# Patient Record
Sex: FEMALE | Race: ASIAN | NOT HISPANIC OR LATINO
[De-identification: names, ages, dates, MRNs, and addresses within clinical notes are randomized per-mention and may not be internally consistent; named-entity substitution may affect disease eponyms.]

---

## 2017-02-27 ENCOUNTER — TRANSCRIPTION ENCOUNTER (OUTPATIENT)
Age: 72
End: 2017-02-27

## 2017-03-01 PROBLEM — Z00.00 ENCOUNTER FOR PREVENTIVE HEALTH EXAMINATION: Status: ACTIVE | Noted: 2017-03-01

## 2017-03-02 ENCOUNTER — APPOINTMENT (OUTPATIENT)
Dept: COLORECTAL SURGERY | Facility: CLINIC | Age: 72
End: 2017-03-02

## 2017-03-02 DIAGNOSIS — Z80.0 FAMILY HISTORY OF MALIGNANT NEOPLASM OF DIGESTIVE ORGANS: ICD-10-CM

## 2017-03-02 DIAGNOSIS — Z87.19 PERSONAL HISTORY OF OTHER DISEASES OF THE DIGESTIVE SYSTEM: ICD-10-CM

## 2017-03-02 DIAGNOSIS — Z86.79 PERSONAL HISTORY OF OTHER DISEASES OF THE CIRCULATORY SYSTEM: ICD-10-CM

## 2017-03-02 DIAGNOSIS — Z86.39 PERSONAL HISTORY OF OTHER ENDOCRINE, NUTRITIONAL AND METABOLIC DISEASE: ICD-10-CM

## 2017-03-02 DIAGNOSIS — Z87.2 PERSONAL HISTORY OF DISEASES OF THE SKIN AND SUBCUTANEOUS TISSUE: ICD-10-CM

## 2017-03-02 DIAGNOSIS — Z78.9 OTHER SPECIFIED HEALTH STATUS: ICD-10-CM

## 2017-03-02 DIAGNOSIS — Z87.39 PERSONAL HISTORY OF OTHER DISEASES OF THE MUSCULOSKELETAL SYSTEM AND CONNECTIVE TISSUE: ICD-10-CM

## 2017-03-08 ENCOUNTER — APPOINTMENT (OUTPATIENT)
Dept: COLORECTAL SURGERY | Facility: CLINIC | Age: 72
End: 2017-03-08

## 2017-03-08 VITALS
BODY MASS INDEX: 24.16 KG/M2 | WEIGHT: 145 LBS | RESPIRATION RATE: 14 BRPM | HEART RATE: 76 BPM | TEMPERATURE: 98 F | DIASTOLIC BLOOD PRESSURE: 79 MMHG | HEIGHT: 65 IN | SYSTOLIC BLOOD PRESSURE: 121 MMHG

## 2017-03-08 DIAGNOSIS — K64.8 OTHER HEMORRHOIDS: ICD-10-CM

## 2017-03-08 PROBLEM — Z80.0 FAMILY HISTORY OF PANCREATIC CANCER: Status: ACTIVE | Noted: 2017-03-08

## 2017-03-08 PROBLEM — Z87.2 HISTORY OF LICHEN PLANUS: Status: RESOLVED | Noted: 2017-03-08 | Resolved: 2017-03-08

## 2017-03-08 PROBLEM — Z86.79 HISTORY OF HYPERTENSION: Status: RESOLVED | Noted: 2017-03-08 | Resolved: 2017-03-08

## 2017-03-08 RX ORDER — LIDOCAINE 5 G/100G
5 OINTMENT TOPICAL
Qty: 30 | Refills: 0 | Status: ACTIVE | COMMUNITY
Start: 2017-03-08 | End: 1900-01-01

## 2017-03-09 PROBLEM — Z87.19 HISTORY OF HEMORRHOIDS: Status: RESOLVED | Noted: 2017-03-08 | Resolved: 2017-03-09

## 2017-03-09 PROBLEM — Z87.39 HISTORY OF ARTHRITIS: Status: RESOLVED | Noted: 2017-03-08 | Resolved: 2017-03-09

## 2017-03-09 PROBLEM — Z86.79 HISTORY OF HYPERTENSION: Status: RESOLVED | Noted: 2017-03-08 | Resolved: 2017-03-09

## 2017-03-09 PROBLEM — Z86.39 HISTORY OF THYROID DISEASE: Status: RESOLVED | Noted: 2017-03-08 | Resolved: 2017-03-09

## 2017-03-09 PROBLEM — Z78.9 SOCIAL ALCOHOL USE: Status: ACTIVE | Noted: 2017-03-08

## 2017-03-09 RX ORDER — METRONIDAZOLE 500 MG/1
500 TABLET ORAL 3 TIMES DAILY
Qty: 15 | Refills: 0 | Status: ACTIVE | COMMUNITY
Start: 2017-03-09 | End: 1900-01-01

## 2017-03-09 RX ORDER — LEVOTHYROXINE SODIUM 88 UG/1
88 TABLET ORAL
Refills: 0 | Status: ACTIVE | COMMUNITY

## 2017-03-09 RX ORDER — DIETHYLTOLUAMIDE 7 %
600-40-500 SPRAY, NON-AEROSOL (ML) TOPICAL
Refills: 0 | Status: ACTIVE | COMMUNITY

## 2017-03-09 RX ORDER — NAPROXEN 500 MG/1
500 TABLET ORAL
Refills: 0 | Status: ACTIVE | COMMUNITY

## 2017-03-09 RX ORDER — VALSARTAN AND HYDROCHLOROTHIAZIDE 80; 12.5 MG/1; MG/1
80-12.5 TABLET, FILM COATED ORAL
Refills: 0 | Status: ACTIVE | COMMUNITY

## 2017-03-09 RX ORDER — CIPROFLOXACIN HYDROCHLORIDE 500 MG/1
500 TABLET, FILM COATED ORAL
Qty: 14 | Refills: 0 | Status: DISCONTINUED | COMMUNITY
Start: 2017-03-08 | End: 2017-03-09

## 2017-03-17 ENCOUNTER — APPOINTMENT (OUTPATIENT)
Dept: COLORECTAL SURGERY | Facility: CLINIC | Age: 72
End: 2017-03-17

## 2017-03-17 VITALS
SYSTOLIC BLOOD PRESSURE: 137 MMHG | WEIGHT: 145 LBS | HEIGHT: 65 IN | RESPIRATION RATE: 14 BRPM | DIASTOLIC BLOOD PRESSURE: 80 MMHG | OXYGEN SATURATION: 97 % | BODY MASS INDEX: 24.16 KG/M2 | TEMPERATURE: 99.2 F | HEART RATE: 66 BPM

## 2017-03-17 DIAGNOSIS — K62.89 OTHER SPECIFIED DISEASES OF ANUS AND RECTUM: ICD-10-CM

## 2017-03-17 DIAGNOSIS — K62.5 HEMORRHAGE OF ANUS AND RECTUM: ICD-10-CM

## 2017-03-29 PROBLEM — K62.89 RECTAL PAIN: Status: ACTIVE | Noted: 2017-03-16

## 2017-03-29 PROBLEM — K62.5 RECTAL BLEEDING: Status: ACTIVE | Noted: 2017-03-09

## 2017-04-05 ENCOUNTER — APPOINTMENT (OUTPATIENT)
Dept: COLORECTAL SURGERY | Facility: CLINIC | Age: 72
End: 2017-04-05

## 2017-04-05 VITALS
SYSTOLIC BLOOD PRESSURE: 158 MMHG | BODY MASS INDEX: 24.16 KG/M2 | HEIGHT: 65 IN | WEIGHT: 145 LBS | TEMPERATURE: 98 F | RESPIRATION RATE: 14 BRPM | DIASTOLIC BLOOD PRESSURE: 77 MMHG | HEART RATE: 65 BPM

## 2017-04-05 DIAGNOSIS — K64.8 OTHER HEMORRHOIDS: ICD-10-CM

## 2017-04-05 DIAGNOSIS — L29.0 PRURITUS ANI: ICD-10-CM

## 2017-04-05 DIAGNOSIS — R15.9 FULL INCONTINENCE OF FECES: ICD-10-CM

## 2017-04-06 ENCOUNTER — APPOINTMENT (OUTPATIENT)
Dept: COLORECTAL SURGERY | Facility: CLINIC | Age: 72
End: 2017-04-06

## 2017-04-26 ENCOUNTER — APPOINTMENT (OUTPATIENT)
Dept: COLORECTAL SURGERY | Facility: CLINIC | Age: 72
End: 2017-04-26

## 2017-05-10 ENCOUNTER — APPOINTMENT (OUTPATIENT)
Dept: COLORECTAL SURGERY | Facility: CLINIC | Age: 72
End: 2017-05-10

## 2017-05-12 ENCOUNTER — APPOINTMENT (OUTPATIENT)
Dept: COLORECTAL SURGERY | Facility: HOSPITAL | Age: 72
End: 2017-05-12

## 2019-06-05 ENCOUNTER — TRANSCRIPTION ENCOUNTER (OUTPATIENT)
Age: 74
End: 2019-06-05

## 2020-08-13 ENCOUNTER — TRANSCRIPTION ENCOUNTER (OUTPATIENT)
Age: 75
End: 2020-08-13

## 2022-01-28 ENCOUNTER — TRANSCRIPTION ENCOUNTER (OUTPATIENT)
Age: 77
End: 2022-01-28

## 2023-08-23 ENCOUNTER — NON-APPOINTMENT (OUTPATIENT)
Age: 78
End: 2023-08-23

## 2023-09-21 ENCOUNTER — EMERGENCY (EMERGENCY)
Facility: HOSPITAL | Age: 78
LOS: 0 days | Discharge: HOME | End: 2023-09-21
Attending: EMERGENCY MEDICINE
Payer: MEDICARE

## 2023-09-21 VITALS
OXYGEN SATURATION: 100 % | DIASTOLIC BLOOD PRESSURE: 72 MMHG | HEART RATE: 69 BPM | TEMPERATURE: 98 F | SYSTOLIC BLOOD PRESSURE: 172 MMHG | RESPIRATION RATE: 18 BRPM

## 2023-09-21 VITALS — WEIGHT: 141.98 LBS | HEIGHT: 64 IN

## 2023-09-21 DIAGNOSIS — M79.601 PAIN IN RIGHT ARM: ICD-10-CM

## 2023-09-21 DIAGNOSIS — M25.531 PAIN IN RIGHT WRIST: ICD-10-CM
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
## 2023-09-21 DIAGNOSIS — S01.01XA LACERATION WITHOUT FOREIGN BODY OF SCALP, INITIAL ENCOUNTER: ICD-10-CM

## 2023-09-21 DIAGNOSIS — Z88.1 ALLERGY STATUS TO OTHER ANTIBIOTIC AGENTS STATUS: ICD-10-CM
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
## 2023-09-21 PROCEDURE — 99284 EMERGENCY DEPT VISIT MOD MDM: CPT | Mod: FS,25

## 2023-09-21 PROCEDURE — 12001 RPR S/N/AX/GEN/TRNK 2.5CM/<: CPT

## 2023-09-21 PROCEDURE — 93010 ELECTROCARDIOGRAM REPORT: CPT

## 2023-09-21 PROCEDURE — 70450 CT HEAD/BRAIN W/O DYE: CPT | Mod: MA

## 2023-09-21 PROCEDURE — 70450 CT HEAD/BRAIN W/O DYE: CPT | Mod: 26,MA

## 2023-09-21 PROCEDURE — 93005 ELECTROCARDIOGRAM TRACING: CPT | Mod: XU

## 2023-09-21 PROCEDURE — 99284 EMERGENCY DEPT VISIT MOD MDM: CPT | Mod: 25

## 2023-09-21 NOTE — ED PROVIDER NOTE - IV ALTEPLASE EXCL REL HIDDEN
----- Message from Marlen Noriega MA sent at 5/24/2022  8:24 AM EDT -----  Regarding: FW: Anxiety medicine    ----- Message -----  From: Mason Parada  Sent: 5/23/2022   6:58 PM EDT  To: [facility]  Subject: Anxiety medicine                                 No we have not tried either of them.    
show

## 2023-09-21 NOTE — ED STATDOCS - PROGRESS NOTE DETAILS
78 yo female with without any significant PMH presents with R head injury s/p fall. Pt was practicing balancing exercises and fell hitting the R scalp against a chair. Denies LOC, vomiting, AC use.   Will repair lac and obtain CT. Likely d/c home. -Dirk Scanlon PA-C Lac repaired and CT negative for ICH or fracture. WIll d/c home. -Dirk Scanlon PA-C

## 2023-09-21 NOTE — ED ADULT NURSE NOTE - OBJECTIVE STATEMENT
Pt is 77y female, A&OX4, ambulatory s/p fall while exercising this morning. Pt states she was doing a balance exercise when she fell over and hit her head on a chair.  Pt denies any dizziness, lightheadedness, or LOC when she fell. Pt endorses head strike with laceration, with pain presenting as a 3/10.  Patient does not currently endorse any dizziness/lightheadedness, SOB, chest pain, N/V, or urinary symptoms.  Pt is awaiting CT. in NAD.

## 2023-09-21 NOTE — ED STATDOCS - NSFOLLOWUPINSTRUCTIONS_ED_ALL_ED_FT
Keep the wound dry for 24 hours.   Afterwards, keep clean with soap and water.   Apply bacitracin 3 times a day.   Return to the ER in 10 days to have the staples removed.   Return to the Emergency Department for worsening or persistent symptoms, and/or ANY NEW OR CONCERNING SYMPTOMS. If you have issues obtaining follow up, please call: [phone] or [phone]  to obtain a doctor or specialist who takes your insurance in your area.           Head Injury    WHAT YOU NEED TO KNOW:    What do I need to know about a head injury? A head injury can include your scalp, face, skull, or brain and range from mild to severe. Effects can appear immediately after the injury or develop later. The effects may last a short time or be permanent. Healthcare providers may want to check your recovery over time. Treatment may change as you recover or develop new health problems from the head injury.    What are the signs and symptoms of a head injury?    An open scalp or skin wound, swelling, or bruising    Mild to moderate headache    Dizziness or loss of balance    Nausea or vomiting    Ringing in the ears or neck pain    Confusion, especially right after the injury    Change in mood, such as feeling restless or irritable    Trouble thinking, remembering, or concentrating    Drowsiness or decreased amount of energy    Trouble sleeping  How is a head injury diagnosed?    Tell your healthcare provider about your injury and symptoms. The provider will do an exam to check your brain function. He or she will check how your pupils react to light. He or she will check your memory, hand grasp, and balance.    You may need x-rays, a CT scan, or an MRI to check for bleeding or major damage to your skull or brain. You may be given contrast liquid to help the pictures show up better. Tell the healthcare provider if you have ever had an allergic reaction to contrast liquid. Do not enter the MRI room with anything metal. Metal can cause serious injury. Tell the provider if you have any metal in or on your body.  How is a head injury treated? A mild head injury may not need to be treated. You may be given medicine to decrease pain. Other treatments may depend on how severe your head injury is. A concussion, hematoma (collection of blood), or traumatic brain injury may need both immediate and long-term treatment.    How can I manage my symptoms?    Rest or do quiet activities. Limit your time watching TV, using the computer, or doing tasks that require a lot of thinking. Slowly return to your normal activities as directed. Do not play sports or do activities that may cause you to get hit in the head. Ask your healthcare provider when you can return to sports.    Apply ice on your head for 15 to 20 minutes every hour or as directed. Use an ice pack, or put crushed ice in a plastic bag. Cover it with a towel before you apply it to your skin. Ice helps prevent tissue damage and decreases swelling and pain.    Have someone stay with you for 24 hours , or as directed. This person can monitor you for problems and call for help if needed. When you are awake, the person should ask you a few questions every few hours to see if you are thinking clearly. An example is to ask your name or address.  What can I do to prevent another head injury?    Wear a helmet that fits properly. Do this when you play sports, or ride a bike, scooter, or skateboard. Helmets help decrease your risk for a serious head injury. Talk to your healthcare provider about other ways you can protect yourself if you play sports.    Wear your seatbelt every time you are in a car. This helps lower your risk for a head injury if you are in a car accident.  Call your local emergency number (911 in the [place]), or have someone else call if:    You cannot be woken.    You have a seizure.    You stop responding to others or you faint.    You have blurry or double vision.    Your speech becomes slurred or confused.    You have arm or leg weakness, loss of feeling, or new problems with coordination.    Your pupils are larger than usual, or one pupil is a different size than the other.    You have blood or clear fluid coming out of your ears or nose.  When should I seek immediate care?    You have repeated or forceful vomiting.    You feel confused.    Your headache gets worse or becomes severe.    You or someone caring for you notices that you are harder to wake than usual.  When should I call my doctor?    Your symptoms last longer than 6 weeks after the injury.    You have questions or concerns about your condition or care.

## 2023-09-21 NOTE — ED ADULT TRIAGE NOTE - CHIEF COMPLAINT QUOTE
Pt states she was doing a balance exercise this morning and fell over. Endorses laceration to the top of her head and R forearm pain. No LOC or anticoagulant use. Denies other injuries. Ambulatory to triage.

## 2023-09-21 NOTE — ED STATDOCS - CLINICAL SUMMARY MEDICAL DECISION MAKING FREE TEXT BOX
plan pt not on blood thinner however over age 64 will do ct head, lac repair pt ambulatory in ED with no other injuries

## 2023-09-21 NOTE — ED STATDOCS - OBJECTIVE STATEMENT
76 yo female with no pertinent PMHx presents to the ED s/p fall while doing exercise. Pt states she fell onto her right side. Pt not endorsing any hip pain, but endorsing right forearm and wrist pain. Pt is UTD on tetanus vaccination.

## 2023-09-21 NOTE — ED STATDOCS - PATIENT PORTAL LINK FT
You can access the FollowMyHealth Patient Portal offered by [facility] [place] by registering at the following website: [email or web address]. By joining [employer]’s FollowMyHealth portal, you will also be able to view your health information using other applications (apps) compatible with our system.

## 2023-09-21 NOTE — ED ADULT NURSE NOTE - NSFALLUNIVINTERV_ED_ALL_ED
Bed/Stretcher in lowest position, wheels locked, appropriate side rails in place/Call bell, personal items and telephone in reach/Instruct patient to call for assistance before getting out of bed/chair/stretcher/Non-slip footwear applied when patient is off stretcher/[place] to call system/Physically safe environment - no spills, clutter or unnecessary equipment/Purposeful proactive rounding/Room/bathroom lighting operational, light cord in reach

## 2023-09-21 NOTE — ED STATDOCS - PHYSICAL EXAMINATION
Constitutional: NAD AOx3  Eyes: PERRL EOMI  Head: Normocephalic atraumatic, head laceration on superior parietal scalp 3cm long  Mouth: MMM  Cardiac: regular rate and rhythm  Resp: Lungs CTAB  GI: Abd s/nd/nt  Neuro: CN2-12 grossly intact, LEMONS x 4  Skin: No visible rashes     cspine non ttp, right wrist non ttp full ROM no contusion or edema

## 2023-09-27 ENCOUNTER — NON-APPOINTMENT (OUTPATIENT)
Age: 78
End: 2023-09-27

## 2023-09-30 ENCOUNTER — EMERGENCY (EMERGENCY)
Facility: HOSPITAL | Age: 78
LOS: 0 days | Discharge: HOME | End: 2023-09-30
Attending: STUDENT IN AN ORGANIZED HEALTH CARE EDUCATION/TRAINING PROGRAM
Payer: MEDICARE

## 2023-09-30 VITALS
OXYGEN SATURATION: 100 % | RESPIRATION RATE: 18 BRPM | TEMPERATURE: 98 F | DIASTOLIC BLOOD PRESSURE: 63 MMHG | SYSTOLIC BLOOD PRESSURE: 149 MMHG | HEART RATE: 62 BPM

## 2023-09-30 VITALS — WEIGHT: 141.98 LBS | HEIGHT: 64 IN

## 2023-09-30 DIAGNOSIS — S01.01XD LACERATION WITHOUT FOREIGN BODY OF SCALP, SUBSEQUENT ENCOUNTER: ICD-10-CM

## 2023-09-30 PROCEDURE — 99212 OFFICE O/P EST SF 10 MIN: CPT

## 2023-09-30 PROCEDURE — L9995: CPT

## 2023-09-30 NOTE — ED STATDOCS - CLINICAL SUMMARY MEDICAL DECISION MAKING FREE TEXT BOX
76 y/o female presents for removal of staples s/p head injury on 9/21. Will remove staples and discharge.

## 2023-09-30 NOTE — ED STATDOCS - PHYSICAL EXAMINATION
Vital signs reviewed  GENERAL: Patient nontoxic appearing, NAD  HEAD: 7 staples intact to right superior parietal scalp. Clean without surrounding erythema, warmth, or discharge.  EYES: Anicteric  ENT: MMM  NECK: Supple, non tender  RESPIRATORY: Normal respiratory effort. CTA B/L. No wheezing, rales, rhonchi  CARDIOVASCULAR: Regular rate and rhythm  ABDOMEN: Soft. Nondistended. Nontender. No guarding or rebound. No CVA tenderness.  MUSCULOSKELETAL/EXTREMITIES: Brisk cap refill. 2+ radial pulses. No leg edema.  SKIN:  Warm and dry  NEURO: AAOx3. No gross FND.  PSYCHIATRIC: Cooperative. Affect appropriate.

## 2023-09-30 NOTE — ED STATDOCS - ATTENDING APP SHARED VISIT CONTRIBUTION OF CARE
Dr. Cam: I performed a face to face bedside interview with patient regarding history of present illness, review of symptoms and past medical history. I completed an independent physical exam.  I have discussed patient's plan of care with PA.   I agree with note as stated above, having amended the EMR as needed to reflect my findings.   This includes HISTORY OF PRESENT ILLNESS, HIV, PAST MEDICAL/SURGICAL/FAMILY/SOCIAL HISTORY, ALLERGIES AND HOME MEDICATIONS, REVIEW OF SYSTEMS, PHYSICAL EXAM, and any PROGRESS NOTES during the time I functioned as the attending physician for this patient.

## 2023-09-30 NOTE — ED STATDOCS - OBJECTIVE STATEMENT
78 y/o female with no pertinent PMHx presents to the ED for staple removal from right scalp, initially placed 9/21 s/p head injury. Patient denies any redness, warmth, or drainage from wound. 78 y/o female with no pertinent PMHx presents to the ED for staple removal from scalp, initially placed 9/21 s/p head injury. Patient denies any redness, warmth, or drainage from wound.

## 2023-09-30 NOTE — ED ADULT TRIAGE NOTE - CHIEF COMPLAINT QUOTE
Pt presents to ED c/o staple removal from scalp injury in 9/21. Has no medical complaints at this time.

## 2023-09-30 NOTE — ED STATDOCS - PATIENT PORTAL LINK FT
You can access the FollowMyHealth Patient Portal offered by [facility] by registering at the following website: [email or web address]. By joining [employer]’s FollowMyHealth portal, you will also be able to view your health information using other applications (apps) compatible with our system.

## 2023-09-30 NOTE — ED STATDOCS - PROGRESS NOTE DETAILS
78 y/o female presents to ED for staple removal initially placed on 9/21/23. Denies complication. 7 staples clean, dry, intact to right superior parietal scalp. Staples removed without complications. Clear for DC. Strict return precautions were given. All questions and concerns were addressed.  - Kenisha Bond PA-C

## 2023-09-30 NOTE — ED STATDOCS - NSFOLLOWUPINSTRUCTIONS_ED_ALL_ED_FT
Please call and follow up with your doctor in 1-3 days.    Wound Closure Removal, Care After  The following information offers guidance on how to care for yourself after your stitches (sutures), staples, or adhesive strips have been removed. Your health care provider may also give you more specific instructions. If you have problems or questions, contact your health care provider.    What can I expect after the procedure?  After your sutures or staples have been removed or your adhesive strips have fallen off, it is common to have:  Some discomfort and swelling in the area.  Slight redness in the area.  Follow these instructions at home:  If you have a dressing:    Wash your hands with soap and water for at least 20 seconds before and after you change your bandage (dressing). If soap and water are not available, use hand .  Change your dressing as told by your health care provider. If your dressing becomes wet or dirty, or develops a bad smell, change it as soon as possible.  If your dressing sticks to your skin, pour warm, clean water over it until it loosens and can be removed without pulling apart the wound edges. Pat the area dry with a soft, clean towel. Do not rub the wound because that may cause bleeding.  Wound care    Washing hands with soap and water.  Check your wound every day for signs of infection. Check for:  More redness, swelling, or pain.  Fluid or blood.  New warmth, a rash, or hardness at the wound site.  Pus or a bad smell.  Wash your hands with soap and water for at least 20 seconds before and after touching your wound. If soap and water are not available, use hand .  Keep the wound area dry and clean. Clean and pat the wound dry as told by your health care provider.  Apply cream or ointment only as told by your health care provider.  If skin glue or adhesive strips were applied after sutures or staples were removed, leave these closures in place until they peel off on their own. If adhesive strip edges start to loosen and curl up, you may trim the loose edges. Do not remove adhesive strips completely unless your health care provider tells you to do that.  Continue to protect the wound from injury.  Do not pick at your wound. Picking can cause an infection.  Bathing    Do not take baths, swim, or use a hot tub until your health care provider approves. Ask your health care provider if you may take showers.  Follow these steps for showering:  If you have a dressing, remove it before getting into the shower.  In the shower, allow soapy water to get on the wound. Avoid scrubbing the wound.  When you get out of the shower, dry the wound by patting it with a clean towel.  Reapply a dressing over the wound, if needed.  Scar care    When your wound has completely healed, help decrease the size of your scar by:  Wearing sunscreen over the scar or covering it with clothing when you are outside. New scars get sunburned easily, which can make scarring worse.  Gently massaging the scarred area. This can decrease scar thickness.  General instructions    Take over-the-counter and prescription medicines only as told by your health care provider.  Keep all follow-up visits. This is important.  Contact a health care provider if:  You have more redness, swelling, or pain around your wound.  You have fluid or blood coming from your wound.  You have new warmth, a rash, or hardness at the wound site.  You have pus or a bad smell coming from your wound.  Your wound opens up.  Get help right away if:  You have a fever or chills.  You have red streaks coming from your wound.  Summary  Change your dressing as told by your health care provider. If your dressing becomes wet or dirty, or develops a bad smell, change it as soon as possible.  Check your wound every day for signs of infection.  Wash your hands with soap and water for 20 seconds before and after touching your wound.  This information is not intended to replace advice given to you by your health care provider. Make sure you discuss any questions you have with your health care provider.

## 2023-12-15 ENCOUNTER — APPOINTMENT (OUTPATIENT)
Age: 78
End: 2023-12-15

## 2024-06-20 NOTE — ED STATDOCS - SCRIBE NAME
normal appearance , without tenderness upon palpation , no deformities , trachea midline , Thyroid normal size , no masses , thyroid nontender Nona Juarez

## 2024-07-08 ENCOUNTER — NON-APPOINTMENT (OUTPATIENT)
Age: 79
End: 2024-07-08

## 2024-10-01 PROBLEM — Z78.9 OTHER SPECIFIED HEALTH STATUS: Chronic | Status: ACTIVE | Noted: 2023-10-02

## 2024-11-26 ENCOUNTER — APPOINTMENT (OUTPATIENT)
Age: 79
End: 2024-11-26
Payer: MEDICARE

## 2024-11-26 PROCEDURE — 99213 OFFICE O/P EST LOW 20 MIN: CPT

## 2025-05-05 ENCOUNTER — NON-APPOINTMENT (OUTPATIENT)
Age: 80
End: 2025-05-05

## 2025-05-16 ENCOUNTER — EMERGENCY (EMERGENCY)
Facility: HOSPITAL | Age: 80
LOS: 0 days | Discharge: HOME | End: 2025-05-16
Attending: EMERGENCY MEDICINE
Payer: MEDICARE

## 2025-05-16 VITALS
TEMPERATURE: 98 F | RESPIRATION RATE: 18 BRPM | SYSTOLIC BLOOD PRESSURE: 143 MMHG | WEIGHT: 146.17 LBS | OXYGEN SATURATION: 95 % | HEART RATE: 63 BPM | DIASTOLIC BLOOD PRESSURE: 57 MMHG

## 2025-05-16 VITALS
SYSTOLIC BLOOD PRESSURE: 121 MMHG | DIASTOLIC BLOOD PRESSURE: 68 MMHG | HEART RATE: 56 BPM | OXYGEN SATURATION: 99 % | RESPIRATION RATE: 18 BRPM | TEMPERATURE: 98 F

## 2025-05-16 DIAGNOSIS — R42 DIZZINESS AND GIDDINESS: ICD-10-CM

## 2025-05-16 DIAGNOSIS — H92.02 OTALGIA, LEFT EAR: ICD-10-CM

## 2025-05-16 DIAGNOSIS — I10 ESSENTIAL (PRIMARY) HYPERTENSION: ICD-10-CM

## 2025-05-16 DIAGNOSIS — Z88.1 ALLERGY STATUS TO OTHER ANTIBIOTIC AGENTS: ICD-10-CM

## 2025-05-16 DIAGNOSIS — E78.00 PURE HYPERCHOLESTEROLEMIA, UNSPECIFIED: ICD-10-CM

## 2025-05-16 LAB
ALBUMIN SERPL ELPH-MCNC: 3.8 G/DL — SIGNIFICANT CHANGE UP (ref 3.3–5)
ALP SERPL-CCNC: 72 U/L — SIGNIFICANT CHANGE UP (ref 40–120)
ALT FLD-CCNC: 35 U/L — SIGNIFICANT CHANGE UP (ref 12–78)
ANION GAP SERPL CALC-SCNC: 4 MMOL/L — LOW (ref 5–17)
AST SERPL-CCNC: 27 U/L — SIGNIFICANT CHANGE UP (ref 15–37)
BASOPHILS # BLD AUTO: 0.03 K/UL — SIGNIFICANT CHANGE UP (ref 0–0.2)
BASOPHILS NFR BLD AUTO: 0.6 % — SIGNIFICANT CHANGE UP (ref 0–2)
BILIRUB SERPL-MCNC: 0.4 MG/DL — SIGNIFICANT CHANGE UP (ref 0.2–1.2)
BUN SERPL-MCNC: 17 MG/DL — SIGNIFICANT CHANGE UP (ref 7–23)
CALCIUM SERPL-MCNC: 9.9 MG/DL — SIGNIFICANT CHANGE UP (ref 8.5–10.1)
CHLORIDE SERPL-SCNC: 104 MMOL/L — SIGNIFICANT CHANGE UP (ref 96–108)
CO2 SERPL-SCNC: 28 MMOL/L — SIGNIFICANT CHANGE UP (ref 22–31)
CREAT SERPL-MCNC: 0.88 MG/DL — SIGNIFICANT CHANGE UP (ref 0.5–1.3)
EGFR: 66 ML/MIN/1.73M2 — SIGNIFICANT CHANGE UP
EGFR: 66 ML/MIN/1.73M2 — SIGNIFICANT CHANGE UP
EOSINOPHIL # BLD AUTO: 0.11 K/UL — SIGNIFICANT CHANGE UP (ref 0–0.5)
EOSINOPHIL NFR BLD AUTO: 2.1 % — SIGNIFICANT CHANGE UP (ref 0–6)
GLUCOSE SERPL-MCNC: 106 MG/DL — HIGH (ref 70–99)
HCT VFR BLD CALC: 40.3 % — SIGNIFICANT CHANGE UP (ref 34.5–45)
HGB BLD-MCNC: 13.1 G/DL — SIGNIFICANT CHANGE UP (ref 11.5–15.5)
IMM GRANULOCYTES # BLD AUTO: 0.01 K/UL — SIGNIFICANT CHANGE UP (ref 0–0.07)
IMM GRANULOCYTES NFR BLD AUTO: 0.2 % — SIGNIFICANT CHANGE UP (ref 0–0.9)
LYMPHOCYTES # BLD AUTO: 1.63 K/UL — SIGNIFICANT CHANGE UP (ref 1–3.3)
LYMPHOCYTES NFR BLD AUTO: 31.7 % — SIGNIFICANT CHANGE UP (ref 13–44)
MAGNESIUM SERPL-MCNC: 2.3 MG/DL — SIGNIFICANT CHANGE UP (ref 1.6–2.6)
MCHC RBC-ENTMCNC: 30.5 PG — SIGNIFICANT CHANGE UP (ref 27–34)
MCHC RBC-ENTMCNC: 32.5 G/DL — SIGNIFICANT CHANGE UP (ref 32–36)
MCV RBC AUTO: 93.7 FL — SIGNIFICANT CHANGE UP (ref 80–100)
MONOCYTES # BLD AUTO: 0.43 K/UL — SIGNIFICANT CHANGE UP (ref 0–0.9)
MONOCYTES NFR BLD AUTO: 8.3 % — SIGNIFICANT CHANGE UP (ref 2–14)
NEUTROPHILS # BLD AUTO: 2.94 K/UL — SIGNIFICANT CHANGE UP (ref 1.8–7.4)
NEUTROPHILS NFR BLD AUTO: 57.1 % — SIGNIFICANT CHANGE UP (ref 43–77)
NRBC # BLD AUTO: 0 K/UL — SIGNIFICANT CHANGE UP (ref 0–0)
NRBC # FLD: 0 K/UL — SIGNIFICANT CHANGE UP (ref 0–0)
NRBC BLD AUTO-RTO: 0 /100 WBCS — SIGNIFICANT CHANGE UP (ref 0–0)
PLATELET # BLD AUTO: 223 K/UL — SIGNIFICANT CHANGE UP (ref 150–400)
PMV BLD: 9.3 FL — SIGNIFICANT CHANGE UP (ref 7–13)
POTASSIUM SERPL-MCNC: 3.9 MMOL/L — SIGNIFICANT CHANGE UP (ref 3.5–5.3)
POTASSIUM SERPL-SCNC: 3.9 MMOL/L — SIGNIFICANT CHANGE UP (ref 3.5–5.3)
PROT SERPL-MCNC: 8.1 GM/DL — SIGNIFICANT CHANGE UP (ref 6–8.3)
RBC # BLD: 4.3 M/UL — SIGNIFICANT CHANGE UP (ref 3.8–5.2)
RBC # FLD: 12.8 % — SIGNIFICANT CHANGE UP (ref 10.3–14.5)
SODIUM SERPL-SCNC: 136 MMOL/L — SIGNIFICANT CHANGE UP (ref 135–145)
TROPONIN I, HIGH SENSITIVITY RESULT: 3.49 NG/L — SIGNIFICANT CHANGE UP
WBC # BLD: 5.15 K/UL — SIGNIFICANT CHANGE UP (ref 3.8–10.5)
WBC # FLD AUTO: 5.15 K/UL — SIGNIFICANT CHANGE UP (ref 3.8–10.5)

## 2025-05-16 PROCEDURE — 71045 X-RAY EXAM CHEST 1 VIEW: CPT

## 2025-05-16 PROCEDURE — 93005 ELECTROCARDIOGRAM TRACING: CPT

## 2025-05-16 PROCEDURE — 99285 EMERGENCY DEPT VISIT HI MDM: CPT | Mod: FS

## 2025-05-16 PROCEDURE — 36415 COLL VENOUS BLD VENIPUNCTURE: CPT

## 2025-05-16 PROCEDURE — 99285 EMERGENCY DEPT VISIT HI MDM: CPT | Mod: 25

## 2025-05-16 PROCEDURE — 83735 ASSAY OF MAGNESIUM: CPT

## 2025-05-16 PROCEDURE — 80053 COMPREHEN METABOLIC PANEL: CPT

## 2025-05-16 PROCEDURE — 93010 ELECTROCARDIOGRAM REPORT: CPT

## 2025-05-16 PROCEDURE — 70450 CT HEAD/BRAIN W/O DYE: CPT

## 2025-05-16 PROCEDURE — 71045 X-RAY EXAM CHEST 1 VIEW: CPT | Mod: 26

## 2025-05-16 PROCEDURE — 96374 THER/PROPH/DIAG INJ IV PUSH: CPT

## 2025-05-16 PROCEDURE — 84484 ASSAY OF TROPONIN QUANT: CPT

## 2025-05-16 PROCEDURE — 70450 CT HEAD/BRAIN W/O DYE: CPT | Mod: 26

## 2025-05-16 PROCEDURE — 85025 COMPLETE CBC W/AUTO DIFF WBC: CPT

## 2025-05-16 RX ORDER — MECLIZINE HCL 12.5 MG
1 TABLET ORAL
Qty: 20 | Refills: 0
Start: 2025-05-16

## 2025-05-16 RX ORDER — MECLIZINE HCL 12.5 MG
25 TABLET ORAL ONCE
Refills: 0 | Status: COMPLETED | OUTPATIENT
Start: 2025-05-16 | End: 2025-05-16

## 2025-05-16 RX ORDER — ACETAMINOPHEN 500 MG/5ML
1000 LIQUID (ML) ORAL ONCE
Refills: 0 | Status: COMPLETED | OUTPATIENT
Start: 2025-05-16 | End: 2025-05-16

## 2025-05-16 RX ADMIN — Medication 400 MILLIGRAM(S): at 13:51

## 2025-05-16 RX ADMIN — Medication 25 MILLIGRAM(S): at 13:51

## 2025-05-16 NOTE — ED ADULT TRIAGE NOTE - CHIEF COMPLAINT QUOTE
Pt presents to ER c/o nasal congestion, left ear pain and dizziness. Pt reports she had cold symptoms 5 days PTA and 3 days ago she began to have left ear pain with dizziness. Pt was seen at  and prescribed cough medicine and nasal spray. Pt reports ear pain became more severe over night and dizziness has diminished. Neuro intact, gait steady

## 2025-05-16 NOTE — ED STATDOCS - COVID-19 ORDERING FACILITY
[facility]  [facility]

## 2025-05-16 NOTE — ED STATDOCS - ATTENDING APP SHARED VISIT CONTRIBUTION OF CARE
I, Paxton Molina MD, personally saw the patient with ACP.  I have personally performed a face to face diagnostic evaluation on this patient.   The initial assessment was performed by myself and then the patient was handed off to the ACP. The patient was followed and re-evaluated by the ACP. All labs, imaging and procedures were evaluated and performed by the ACP and I was available for consultation if any questions in the patients care came up.   I personally made/approved the management plan and take responsibility for the patient management.

## 2025-05-16 NOTE — ED STATDOCS - PATIENT PORTAL LINK FT
You can access the FollowMyHealth Patient Portal offered by [facility] by registering at the following website: [email or web address]. By joining [employer]’s FollowMyHealth portal, you will also be able to view your health information using other applications (apps) compatible with our system.

## 2025-05-16 NOTE — ED STATDOCS - PHYSICAL EXAMINATION
Constitutional: NAD AAOx3  Eyes: PERRLA EOMI  Head: Normocephalic atraumatic, normal TMs bilaterally   Mouth: MMM  Cardiac: regular rate   Resp: unlabored breathing  GI: Abd s/nt/nd  Neuro:  cranial nerves II through XII intact, normal strength sensation coordination, NIH is 0.   Skin: No visible rashes  MSK:  Normal peripheral pulses, no lower extremity swelling. Constitutional: NAD AAOx3  Eyes: PERRLA EOMI  Head: Normocephalic atraumatic, normal TMs bilaterally   Mouth: MMM  Cardiac: regular rate   Resp: unlabored breathing clear b/l   GI: Abd s/nt/nd  Neuro:  cranial nerves II through XII intact, normal strength sensation coordination, NIH is 0.   Skin: No visible rashes  MSK:  Normal peripheral pulses, no lower extremity swelling.

## 2025-05-16 NOTE — ED STATDOCS - OBJECTIVE STATEMENT
79-year-old female history of hypertension high cholesterol presents to the emergency department with left ear pain and dizziness.  Patient states that over the last week she had upper respiratory infection with cough congestion runny nose sore throat.  Went to urgent care where they gave her Tessalon as well as nasal sprays.  States that over the last 3 days she has had some vertigo-like symptoms.  States she has had BPPV in the past and it feels similar.  States that dizziness is not present when sitting still but worse with certain head movements.  Last night developed pain in the left ear so came in for evaluation.  No chest pain shortness of breath numbness weakness slurred speech.

## 2025-05-16 NOTE — ED ADULT NURSE NOTE - NSFALLRISKASMTTYPE_ED_ALL_ED
-- DO NOT REPLY / DO NOT REPLY ALL --  -- Message is from the [facility]--    Referral Request  Name of Specialist: jacek Stern  Provider's specialty: Urology    Medical condition for referral:  3 month follow up appt    Is this a NEW request?: no      Referral ordered by: Dr Jacek Stern      Insurance type: [employer] HMO      Payor:  [employer] / Plan:  BE [place]  / Product Type: [facility]      Preferred Delivery Method   Fax - number to send to: [phone]        Alternative phone number: NA    Turnaround time given to caller:   \"This message will be sent to [state Provider's full name]. The clinical team will return your call as soon as they review your message. Typically, it takes 3 business days to process referral requests.\"   Initial (On Arrival)

## 2025-05-16 NOTE — ED STATDOCS - PROGRESS NOTE DETAILS
Labs and imaging with no actionable findings. Pt feels well. Will dc home with meclizine. - Fermín Whitmore Pa-C

## 2025-05-16 NOTE — ED STATDOCS - CLINICAL SUMMARY MEDICAL DECISION MAKING FREE TEXT BOX
79-year-old female history of hypertension high cholesterol presents to the emergency department with left ear pain and dizziness.  Patient states that over the last week she had upper respiratory infection with cough congestion runny nose sore throat.  Went to urgent care where they gave her Tessalon as well as nasal sprays.  States that over the last 3 days she has had some vertigo-like symptoms.  States she has had BPPV in the past and it feels similar.  States that dizziness is not present when sitting still but worse with certain head movements.  Last night developed pain in the left ear so came in for evaluation.  No chest pain shortness of breath numbness weakness slurred speech.  Exam here with normal TMs bilaterally cranial nerves II through XII intact normal strength sensation coordination NIH is 0.  Normal peripheral pulses no lower extremity swelling.  Patient likely with upper respiratory infection triggering BPPV.  Low suspicion for CVA ACS.  Will check labs EKG CT head symptom control reassess.